# Patient Record
Sex: MALE | NOT HISPANIC OR LATINO | Employment: FULL TIME | URBAN - METROPOLITAN AREA
[De-identification: names, ages, dates, MRNs, and addresses within clinical notes are randomized per-mention and may not be internally consistent; named-entity substitution may affect disease eponyms.]

---

## 2023-11-28 ENCOUNTER — OFFICE VISIT (OUTPATIENT)
Dept: UROLOGY | Facility: CLINIC | Age: 33
End: 2023-11-28
Payer: COMMERCIAL

## 2023-11-28 VITALS — HEIGHT: 71 IN | TEMPERATURE: 96.9 F | BODY MASS INDEX: 24.92 KG/M2 | WEIGHT: 178 LBS

## 2023-11-28 DIAGNOSIS — R30.9 PAINFUL URINATION: ICD-10-CM

## 2023-11-28 LAB
APPEARANCE UR: CLEAR
BILIRUB UR STRIP.AUTO-MCNC: NEGATIVE MG/DL
COLOR UR: YELLOW
GLUCOSE UR STRIP.AUTO-MCNC: NEGATIVE MG/DL
KETONES UR STRIP.AUTO-MCNC: NEGATIVE MG/DL
LEUKOCYTE ESTERASE UR QL STRIP.AUTO: NEGATIVE
NITRITE UR QL STRIP.AUTO: NEGATIVE
PH UR STRIP.AUTO: 6 [PH]
PROT UR STRIP.AUTO-MCNC: NEGATIVE MG/DL
RBC # UR STRIP.AUTO: NEGATIVE /UL
SP GR UR STRIP.AUTO: 1.02
UROBILINOGEN UR STRIP.AUTO-MCNC: <2 MG/DL

## 2023-11-28 PROCEDURE — 87086 URINE CULTURE/COLONY COUNT: CPT

## 2023-11-28 PROCEDURE — 1036F TOBACCO NON-USER: CPT | Performed by: NURSE PRACTITIONER

## 2023-11-28 PROCEDURE — 99203 OFFICE O/P NEW LOW 30 MIN: CPT | Performed by: NURSE PRACTITIONER

## 2023-11-28 PROCEDURE — 81003 URINALYSIS AUTO W/O SCOPE: CPT

## 2023-11-28 PROCEDURE — 51798 US URINE CAPACITY MEASURE: CPT | Performed by: NURSE PRACTITIONER

## 2023-11-28 ASSESSMENT — PAIN SCALES - GENERAL: PAINLEVEL: 0-NO PAIN

## 2023-11-28 NOTE — PROGRESS NOTES
[facility]  Outpatient Clinic Note      Patient: Lina Fair  Age/Sex: 33 y.o., male  MRN: [id number]      History of Present Illness  33-year-old gentleman, otherwise healthy, presents as new patient for evaluation of symptoms. He reports a long standing history of episodes of painful urination that seem to self resolve. He has noticed that these episodes seem to worsen in colder months, or if he sits on anything cold. He develops pain after voiding throughout the urethra. This is not associated with gross hematuria, flank pain, fevers or chills. Additionally, he has noticed episodes of urinary urgency with postvoid dribble that is exacerbated with energy drinks. No fevers or chills.     Past Medical & Surgical History  No past medical history on file.   Past Surgical History:   Procedure Laterality Date    US GUIDED NEEDLE LIVER BIOPSY  6/9/2022    US GUIDED NEEDLE LIVER BIOPSY 6/9/2022 [facility]          Labs  NA    Medications:  No current outpatient medications on file prior to visit.     No current facility-administered medications on file prior to visit.          Physical Exam                                                                                                                      General: Well developed, well nourished, alert and cooperative, appears in no acute distress  Eyes: Non-injected conjunctiva, sclera clear, no proptosis  Cardiac: Extremities are warm and well perfused. No edema, cyanosis or pallor.   Lungs: Breathing is easy, non-labored. Speaking in clear and complete sentences. Normal diaphragmatic movement.  MSK: Ambulatory with steady gait, unassisted  Neuro: alert and oriented to person, place and time  Psych: Demonstrates good judgement and reason, without hallucinations, abnormal affect or abnormal behaviors.  Skin: no obvious lesions, no rashes      Review of Systems  Review of Systems   All other systems reviewed and are negative.         Imaging  NA      Assessment  & Plan  33-year-old gentleman, otherwise healthy, presents as new patient for evaluation of symptoms. He reports a long standing history of episodes of painful urination that seem to self resolve. He has noticed that these episodes seem to worsen in colder months, or if he sits on anything cold. He develops pain after voiding throughout the urethra. This is not associated with gross hematuria, flank pain, fevers or chills. Additionally, he has noticed episodes of urinary urgency with postvoid dribble that is exacerbated with energy drinks. No fevers or chills. PVR is 8cc.    We discussed his symptoms in great detail. Urine will be sent for UA and culture. He would greatly benefit from PFPT, explained. Referral was placed. We discussed he may have some degree of chronic inflammatory prostatitis, of which he would benefit from NSAIDs PRN. We discussed PFPT will help with these symptoms, as well. We discussed avoiding bladder irritants.     Follow-up 3 months. Will consider cystoscopy with any persistent symptoms.       Reviewed and approved by PIPPA PEDROZA on 11/28/23 at 1:15 PM.

## 2023-11-30 LAB — BACTERIA UR CULT: NO GROWTH

## 2023-12-28 ENCOUNTER — APPOINTMENT (OUTPATIENT)
Dept: RADIOLOGY | Facility: HOSPITAL | Age: 33
End: 2023-12-28
Payer: COMMERCIAL

## 2023-12-28 ENCOUNTER — HOSPITAL ENCOUNTER (EMERGENCY)
Facility: HOSPITAL | Age: 33
Discharge: HOME | End: 2023-12-28
Attending: STUDENT IN AN ORGANIZED HEALTH CARE EDUCATION/TRAINING PROGRAM
Payer: COMMERCIAL

## 2023-12-28 VITALS
HEIGHT: 71 IN | OXYGEN SATURATION: 99 % | SYSTOLIC BLOOD PRESSURE: 140 MMHG | TEMPERATURE: 98.4 F | RESPIRATION RATE: 16 BRPM | WEIGHT: 181.66 LBS | HEART RATE: 73 BPM | BODY MASS INDEX: 25.43 KG/M2 | DIASTOLIC BLOOD PRESSURE: 77 MMHG

## 2023-12-28 DIAGNOSIS — R10.32 LEFT INGUINAL PAIN: Primary | ICD-10-CM

## 2023-12-28 DIAGNOSIS — M79.18 MUSCULOSKELETAL PAIN: ICD-10-CM

## 2023-12-28 LAB
ALBUMIN SERPL-MCNC: 4.5 G/DL (ref 3.5–5)
ALP BLD-CCNC: 38 U/L (ref 35–125)
ALT SERPL-CCNC: 11 U/L (ref 5–40)
ANION GAP SERPL CALC-SCNC: 11 MMOL/L
APPEARANCE UR: CLEAR
AST SERPL-CCNC: 19 U/L (ref 5–40)
BASOPHILS # BLD AUTO: 0.05 X10*3/UL (ref 0–0.1)
BASOPHILS NFR BLD AUTO: 0.5 %
BILIRUB DIRECT SERPL-MCNC: <0.2 MG/DL (ref 0–0.2)
BILIRUB SERPL-MCNC: 0.4 MG/DL (ref 0.1–1.2)
BILIRUB UR STRIP.AUTO-MCNC: NEGATIVE MG/DL
BUN SERPL-MCNC: 12 MG/DL (ref 8–25)
CALCIUM SERPL-MCNC: 9.4 MG/DL (ref 8.5–10.4)
CHLORIDE SERPL-SCNC: 100 MMOL/L (ref 97–107)
CO2 SERPL-SCNC: 26 MMOL/L (ref 24–31)
COLOR UR: ABNORMAL
CREAT SERPL-MCNC: 1 MG/DL (ref 0.4–1.6)
EOSINOPHIL # BLD AUTO: 0.14 X10*3/UL (ref 0–0.7)
EOSINOPHIL NFR BLD AUTO: 1.4 %
ERYTHROCYTE [DISTWIDTH] IN BLOOD BY AUTOMATED COUNT: 12.6 % (ref 11.5–14.5)
GFR SERPL CREATININE-BSD FRML MDRD: >90 ML/MIN/1.73M*2
GLUCOSE SERPL-MCNC: 95 MG/DL (ref 65–99)
GLUCOSE UR STRIP.AUTO-MCNC: NORMAL MG/DL
HCT VFR BLD AUTO: 43.9 % (ref 41–52)
HGB BLD-MCNC: 14.9 G/DL (ref 13.5–17.5)
IMM GRANULOCYTES # BLD AUTO: 0.02 X10*3/UL (ref 0–0.7)
IMM GRANULOCYTES NFR BLD AUTO: 0.2 % (ref 0–0.9)
KETONES UR STRIP.AUTO-MCNC: ABNORMAL MG/DL
LEUKOCYTE ESTERASE UR QL STRIP.AUTO: NEGATIVE
LIPASE SERPL-CCNC: 24 U/L (ref 16–63)
LYMPHOCYTES # BLD AUTO: 1.97 X10*3/UL (ref 1.2–4.8)
LYMPHOCYTES NFR BLD AUTO: 20.1 %
MCH RBC QN AUTO: 30.2 PG (ref 26–34)
MCHC RBC AUTO-ENTMCNC: 33.9 G/DL (ref 32–36)
MCV RBC AUTO: 89 FL (ref 80–100)
MONOCYTES # BLD AUTO: 0.51 X10*3/UL (ref 0.1–1)
MONOCYTES NFR BLD AUTO: 5.2 %
NEUTROPHILS # BLD AUTO: 7.09 X10*3/UL (ref 1.2–7.7)
NEUTROPHILS NFR BLD AUTO: 72.6 %
NITRITE UR QL STRIP.AUTO: NEGATIVE
NRBC BLD-RTO: 0 /100 WBCS (ref 0–0)
PH UR STRIP.AUTO: 6.5 [PH]
PLATELET # BLD AUTO: 252 X10*3/UL (ref 150–450)
POTASSIUM SERPL-SCNC: 3.8 MMOL/L (ref 3.4–5.1)
PROT SERPL-MCNC: 7.4 G/DL (ref 5.9–7.9)
PROT UR STRIP.AUTO-MCNC: NEGATIVE MG/DL
RBC # BLD AUTO: 4.93 X10*6/UL (ref 4.5–5.9)
RBC # UR STRIP.AUTO: NEGATIVE /UL
SODIUM SERPL-SCNC: 137 MMOL/L (ref 133–145)
SP GR UR STRIP.AUTO: 1.02
UROBILINOGEN UR STRIP.AUTO-MCNC: NORMAL MG/DL
WBC # BLD AUTO: 9.8 X10*3/UL (ref 4.4–11.3)

## 2023-12-28 PROCEDURE — 81003 URINALYSIS AUTO W/O SCOPE: CPT | Performed by: EMERGENCY MEDICINE

## 2023-12-28 PROCEDURE — 83690 ASSAY OF LIPASE: CPT | Performed by: EMERGENCY MEDICINE

## 2023-12-28 PROCEDURE — 85025 COMPLETE CBC W/AUTO DIFF WBC: CPT | Performed by: EMERGENCY MEDICINE

## 2023-12-28 PROCEDURE — 82248 BILIRUBIN DIRECT: CPT | Performed by: EMERGENCY MEDICINE

## 2023-12-28 PROCEDURE — 93975 VASCULAR STUDY: CPT

## 2023-12-28 PROCEDURE — 36415 COLL VENOUS BLD VENIPUNCTURE: CPT | Performed by: EMERGENCY MEDICINE

## 2023-12-28 PROCEDURE — 2500000004 HC RX 250 GENERAL PHARMACY W/ HCPCS (ALT 636 FOR OP/ED): Performed by: EMERGENCY MEDICINE

## 2023-12-28 PROCEDURE — 96374 THER/PROPH/DIAG INJ IV PUSH: CPT

## 2023-12-28 PROCEDURE — 99285 EMERGENCY DEPT VISIT HI MDM: CPT | Performed by: STUDENT IN AN ORGANIZED HEALTH CARE EDUCATION/TRAINING PROGRAM

## 2023-12-28 PROCEDURE — 82374 ASSAY BLOOD CARBON DIOXIDE: CPT | Performed by: EMERGENCY MEDICINE

## 2023-12-28 PROCEDURE — 74176 CT ABD & PELVIS W/O CONTRAST: CPT

## 2023-12-28 RX ORDER — KETOROLAC TROMETHAMINE 30 MG/ML
15 INJECTION, SOLUTION INTRAMUSCULAR; INTRAVENOUS ONCE
Status: COMPLETED | OUTPATIENT
Start: 2023-12-28 | End: 2023-12-28

## 2023-12-28 RX ADMIN — KETOROLAC TROMETHAMINE 15 MG: 30 INJECTION, SOLUTION INTRAMUSCULAR at 19:26

## 2023-12-28 ASSESSMENT — PAIN DESCRIPTION - ORIENTATION: ORIENTATION: LEFT

## 2023-12-28 ASSESSMENT — PAIN SCALES - GENERAL
PAINLEVEL_OUTOF10: 6
PAINLEVEL_OUTOF10: 5 - MODERATE PAIN

## 2023-12-28 ASSESSMENT — PAIN - FUNCTIONAL ASSESSMENT
PAIN_FUNCTIONAL_ASSESSMENT: 0-10
PAIN_FUNCTIONAL_ASSESSMENT: 0-10

## 2023-12-28 ASSESSMENT — PAIN DESCRIPTION - FREQUENCY: FREQUENCY: CONSTANT/CONTINUOUS

## 2023-12-28 ASSESSMENT — PAIN DESCRIPTION - ONSET: ONSET: SUDDEN

## 2023-12-28 ASSESSMENT — PAIN DESCRIPTION - PAIN TYPE: TYPE: ACUTE PAIN

## 2023-12-28 ASSESSMENT — PAIN DESCRIPTION - DESCRIPTORS: DESCRIPTORS: DULL;PRESSURE

## 2023-12-28 ASSESSMENT — COLUMBIA-SUICIDE SEVERITY RATING SCALE - C-SSRS
2. HAVE YOU ACTUALLY HAD ANY THOUGHTS OF KILLING YOURSELF?: NO
1. IN THE PAST MONTH, HAVE YOU WISHED YOU WERE DEAD OR WISHED YOU COULD GO TO SLEEP AND NOT WAKE UP?: NO
6. HAVE YOU EVER DONE ANYTHING, STARTED TO DO ANYTHING, OR PREPARED TO DO ANYTHING TO END YOUR LIFE?: NO

## 2023-12-28 ASSESSMENT — PAIN DESCRIPTION - PROGRESSION: CLINICAL_PROGRESSION: GRADUALLY WORSENING

## 2023-12-28 ASSESSMENT — PAIN DESCRIPTION - LOCATION: LOCATION: GROIN

## 2023-12-28 NOTE — Clinical Note
Lina Christopher was seen and treated in our emergency department on 12/28/2023.  He may return to work on 12/29/2023.  Please allow for lighter options for tool belt.     If you have any questions or concerns, please don't hesitate to call.      Daniel Esparza MD

## 2023-12-29 NOTE — ED PROVIDER NOTES
HPI   Chief Complaint   Patient presents with    Groin Pain     Pt having left groin pain as well as low back pain since early last week. Pt states he does have a physical job and feels he may have pulled something or has a hernia.       HPI  See MDM                  No data recorded                Patient History   No past medical history on file.  Past Surgical History:   Procedure Laterality Date    US GUIDED NEEDLE LIVER BIOPSY  6/9/2022    US GUIDED NEEDLE LIVER BIOPSY 6/9/2022 [facility]     No family history on file.  Social History     Tobacco Use    Smoking status: Former     Types: Cigarettes    Smokeless tobacco: Never   Substance Use Topics    Alcohol use: Yes     Comment: socailly    Drug use: Never       Physical Exam   ED Triage Vitals [12/28/23 1911]   Temp Heart Rate Resp BP   36.9 °C (98.4 °F) 73 16 140/78      SpO2 Temp Source Heart Rate Source Patient Position   99 % Temporal Monitor Sitting      BP Location FiO2 (%)     Right arm --       Physical Exam  See [facility]  ED Course & MDM   Diagnoses as of 12/28/23 2225   Left inguinal pain   Musculoskeletal pain       Medical Decision Making  33-year-old male with no significant past medical history presents to the emergency room with groin pain.  Patient notes that the pain has been going on for at least 10 to 15 days and today he felt as though it was not getting better and presents for evaluation.  Patient is unable to exactly describe what affects him but he does note when he is sitting down for longer periods of time and on his off days the pain is more uncomfortable as when he is at work he is typically very active and moves around a great deal and does not notice it as much.  Patient otherwise denies any pain on urination, flank pain, nausea, vomiting, diarrhea, constipation, surgical history, shortness of breath, chest pain, abnormal discharge.  Patient otherwise is sexually active only with his wife and otherwise has no history of sexually  transmitted infection.    ED Triage Vitals [12/28/23 1911]   Temp Heart Rate Resp BP   36.9 °C (98.4 °F) 73 16 140/78      SpO2 Temp Source Heart Rate Source Patient Position   99 % Temporal Monitor Sitting      BP Location FiO2 (%)     Right arm --       Vital signs reviewed: Afebrile, nontachycardic, normotensive, 99% on room air    Exam     Constitutional: No acute distress. Resting comfortably.   Head: Normocephalic, atraumatic.   Eyes: Pupils equal bilaterally, EOM grossly intact, conjunctiva normal.  Mouth/Throat: Oropharynx is clear, moist mucus membranes.   Neck: Supple. No lymphadenopathy.  Cardiovascular: Regular rate and regular rhythm. Extremities are well-perfused.   Pulmonary/Chest: No respiratory distress, breathing comfortably on room air.    Abdominal: Soft, non-tender, non-distended. No rebound or guarding.   Musculoskeletal: No lower extremity edema.       Skin: Warm, dry, and intact.   Neurological: Patient is oriented to person, place, time, and situation. Face symmetric, hearing intact to voice, speech normal. Moves all extremities.   Psych: Mood, affect, thought content, and judgment normal.    Differential includes but is not limited to:  Testicular torsion versus diverticulitis versus hernia versus nephrolithiasis    Amount and/or Complexity of Data Reviewed  External Data Reviewed: notes.      Labs: ordered.  Labs Reviewed   URINALYSIS WITH REFLEX MICROSCOPIC - Abnormal       Result Value    Color, Urine Light-Yellow      Appearance, Urine Clear      Specific Gravity, Urine 1.025      pH, Urine 6.5      Protein, Urine NEGATIVE      Glucose, Urine Normal      Blood, Urine NEGATIVE      Ketones, Urine 10 (1+) (*)     Bilirubin, Urine NEGATIVE      Urobilinogen, Urine Normal      Nitrite, Urine NEGATIVE      Leukocyte Esterase, Urine NEGATIVE     BASIC METABOLIC PANEL - Normal    Glucose 95      Sodium 137      Potassium 3.8      Chloride 100      Bicarbonate 26      Urea Nitrogen 12       Creatinine 1.00      eGFR >90      Calcium 9.4      Anion Gap 11     HEPATIC FUNCTION PANEL - Normal    AST 19      ALT 11      Alkaline Phosphatase 38      Bilirubin, Total 0.4      Bilirubin, Direct <0.2      Total Protein 7.4      Albumin 4.5     LIPASE - Normal    Lipase 24     CBC WITH AUTO DIFFERENTIAL    WBC 9.8      nRBC 0.0      RBC 4.93      Hemoglobin 14.9      Hematocrit 43.9      MCV 89      MCH 30.2      MCHC 33.9      RDW 12.6      Platelets 252      Neutrophils % 72.6      Immature Granulocytes %, Automated 0.2      Lymphocytes % 20.1      Monocytes % 5.2      Eosinophils % 1.4      Basophils % 0.5      Neutrophils Absolute 7.09      Immature Granulocytes Absolute, Automated 0.02      Lymphocytes Absolute 1.97      Monocytes Absolute 0.51      Eosinophils Absolute 0.14      Basophils Absolute 0.05         Radiology: ordered and independent interpretation performed.  CT abdomen pelvis is interpreted by me shows no large dilated loops of bowel suggest bowel obstruction.    ECG/medicine tests: ordered and independent interpretation performed.  Diagnoses as of 12/28/23 2225   Left inguinal pain   Musculoskeletal pain       Lab work as interpreted by me shows no significant leukocytosis or anemia at this time on CBC and basic metabolic panel with no acute findings with normal hepatic function panel.  Urinalysis shows small ketones but otherwise no evidence of urinary tract infection or blood.  CT abdomen pelvis per radiology with no acute findings and ultrasound testicular with no evidence of testicular torsion.  Patient's presentation and chronic pain does not seem consistent with testicular torsion or testicular issues at this time and patient otherwise with no high risk factors for sexually transmitted infection.  Patient does have follow-up with urology as well as primary care provider and will follow-up as needed.    PLAN AND FOLLOW-UP: Patient counseled on all findings, diagnosis and treatment plan.  Patient's questions and concerns addressed. Patient stable, discharged with instructions to follow up with PMD, and to return to ED at any time for worsening symptoms or any other concerns. Patient demonstrates understanding of the findings and the importance of appropriate follow up care.  PATIENT REFERRED TO:  Jesus Garces MD  [address] [place] [address]  [facility] [address]  [place] [address]  [phone]                DISCHARGE MEDICATIONS:  New Prescriptions    No medications on file       Daniel Esparza MD  10:25 PM    Attending Emergency Physician  [facility]            Procedure  Procedures     Daniel Esparza MD  12/28/23 1194

## 2023-12-29 NOTE — DISCHARGE INSTRUCTIONS
Thank you for choosing [facility] and [facility]  for your emergency care.    Please return to the Emergency Department immediately if new or worsening symptoms occur. Symptoms that are most concerning include testicular pain or fevers or chills that necessitate immediate return.     It is important to remember that your care does not end here and you must continue to monitor your condition closely. Please return to the emergency department for any worsening or concerning signs or symptoms as directed by our conversations and the discharge instructions. Otherwise please follow up with your doctor in 2 days if no better or worse. If you do not have a doctor please contact the referral number on your discharge instructions. Please contact any physician specialists provided in your discharge notes as it is very important to follow up with them regarding your condition. If you are unable to reach the physicians provided, please come back to the Emergency Department at any time.      As always, please take medications as directed. If you have any questions at all regarding your medications, please contact the pharmacist, the emergency department, or your doctor. Before taking any medication prescribed in the Emergency Department, please review the medication side effects and drug interactions as they may interact with your home medications.     Having trouble affording medications? Try [employer] ! (This is not a hospital endorsed website, merely a recommendation based on my own personal experiences with [employer])       Daniel Esparza MD

## 2023-12-29 NOTE — ED TRIAGE NOTES
TRIAGE NOTE   I saw the patient as the Clinician in Triage and performed a brief history and physical exam, established acuity, and ordered appropriate tests to develop basic plan of care. Patient will be seen by an GRANT, resident and/or physician who will independently evaluate the patient. Please see subsequent provider notes for further details and disposition.     Brief HPI: In brief, Lina Christopher is a 33 y.o. male that presents for back and groin pain.  States for the last 1 week he has had pain through his low back, pain down into his groin.  No johnathon urinary symptoms.  No fevers.  Uncertain if this is an injury.  No illnesses otherwise or other complaints.    Focused Physical exam:   General: Awake, alert, oriented and appears in no acute distress sitting upright  HEENT: NCAT, EOMI, sclerae anicteric, oropharynx clear, and neck is supple  CV: RRR, no extremity pulse deficits  Resp: Lungs CTA b/l, no respiratory distress or accessory muscle use  Abd: Soft, no tenderness, rebound, or guarding  Back: Nontender  Ext: WWP, cap refill < 2 sec  Skin: Dry without rash  Neuro: No focal deficits, gait steady    Plan/MDM:   33-year-old male presents reporting back and groin pain for the last several days.  Exam well, afebrile.  Abdomen without focal tenderness.  No rebound or guarding or suggestion of peritonitis.  He is not clinically obstructed.  Plan for lab work, imaging, reassessment.    Please see subsequent provider note for further details and disposition

## 2024-01-03 ENCOUNTER — APPOINTMENT (OUTPATIENT)
Dept: PHYSICAL THERAPY | Facility: CLINIC | Age: 34
End: 2024-01-03
Payer: COMMERCIAL

## 2024-01-22 ENCOUNTER — TREATMENT (OUTPATIENT)
Dept: PHYSICAL THERAPY | Facility: CLINIC | Age: 34
End: 2024-01-22
Payer: COMMERCIAL

## 2024-01-22 DIAGNOSIS — R30.9 PAINFUL URINATION: ICD-10-CM

## 2024-01-22 PROCEDURE — 97112 NEUROMUSCULAR REEDUCATION: CPT | Mod: GP | Performed by: PHYSICAL THERAPIST

## 2024-01-22 PROCEDURE — 97162 PT EVAL MOD COMPLEX 30 MIN: CPT | Mod: GP | Performed by: PHYSICAL THERAPIST

## 2024-01-22 PROCEDURE — 97110 THERAPEUTIC EXERCISES: CPT | Mod: GP | Performed by: PHYSICAL THERAPIST

## 2024-01-22 ASSESSMENT — PAIN SCALES - GENERAL: PAINLEVEL_OUTOF10: 0 - NO PAIN

## 2024-01-22 ASSESSMENT — PAIN - FUNCTIONAL ASSESSMENT: PAIN_FUNCTIONAL_ASSESSMENT: 0-10

## 2024-01-22 NOTE — PROGRESS NOTES
Physical Therapy Evaluation    Patient Name: Lina Christopher  MRN: [id number]  Today's Date: 1/22/24  Time Calculation  Start Time: 1030  Stop Time: 1135  Time Calculation (min): 65 min      Subjective   General:  General  Reason for Referral: painful urination  Referred By: Aurea Cash CNP    Patient reported hx of injury: started a year ago;  feels like some urine leaks out after;  in the past recalls that there was pain after urination-- could feel pressure into buttock-- this hasn't happened (no leaking);  seeing chiropractor for back pain     Precautions:  Precautions  Precautions Comment: no precautions  Pain:  Pain Assessment: 0-10  Pain Score: 0 - No pain  Home Living:     Job task:  a lot of walking with job   Prior Function Per Pt/Caregiver Report:     Independent    Objective   Posture:   Slight increase in lumbar lordosis  Range of Motion:     Lumbar ROM Range   Flexion 50%   Extension 50%   R sidebend 50%   L sidebend 50%    WFL ROM HIP    Strength:     LE MMT L R   Hip flexion 5/5 5/5   Hip extension 4/5 4/5   Hip abduction 4/5 4/5   Knee flexion 5/5 5/5   Knee extension 5/5 5/5   Ankle DF 5/5 5/5      Fair transverse abdominis activation  Fair load transfer with active straight leg raise    Flexibility:   Tightness in piriformis bilateral, tight bilateral hamstrings, tight psoas bilateral, tightness in adductors bilateral    Palpation:   Not tender to palpation of gluteal      Objective   PELVIC HISTORY:    Water intake/diet: 50 ounces of liquid -- (about 1/2 or less of water)    PELVIC PAIN:     Description: used to have pain post urination but of lately has had no pain but is having leaking post urination;  usually stands to urinate  Frequency: after every time patient voids  Pain with intercourse: no pain;    History of back pain: yes a few months ago injured with lifting;  -- continues to see chiropractor ([facility])     EXERCISE:  Do you do Kegels? no   Current exercise regime: only works out  1-2 times a week;  -- Works in a [employer]-- lots of walking (20,000+ steps a day)    BLADDER:     Daytime Voiding Frequency: goes to bathroom 10 times a day;    Nighttime Voiding Frequency: no usual waking   Unintentional urine loss frequency: after every void  Leakage occurs with: post void   Leakage amount: dribbles  Slow/weak urine stream: when strains, slow or weak stream  Difficulty starting urine stream/push to urinate: sometimes has to strain;    Able to completely empty bladder: feels at time of urination -- empties bladder    BOWEL:     Denies constipation or bowel issues    INTERNAL PALPATION:  -- Patient declined internal/external pelvic floor examination    OUTCOMES MEASURE:  PFIQ-7    Assessment  PT Assessment Results: Decreased strength, Decreased range of motion, Decreased coordination, Impaired tone  Rehab Prognosis: Good  Evaluation/Treatment Tolerance: Patient tolerated treatment well    Pt is a 33 y.o. male who presents with impairments of tightness, weakness, impaired ROM. These impairments have led to functional limitations including incontinence post voiding and pain post urination. Pt would benefit from skilled physical therapy intervention to improve above impairments and facilitate return to function.    Plan  Treatment/Interventions: Dry needling, Education/ Instruction, Electrical stimulation, Biofeedback, Manual therapy, Neuromuscular re-education, Therapeutic exercises, Therapeutic activities  PT Plan: Skilled PT  PT Frequency: 1 time per week  Duration: 10 sessions  Number of Treatments Authorized: 30 visits, 50$ copay  Rehab Potential: Good    OP EDUCATION:  Outpatient Education  Individual(s) Educated: Patient  Education Provided: Anatomy, Home Exercise Program, POC, Physiology  Risk and Benefits Discussed with Patient/Caregiver/Other: yes  Patient/Caregiver Demonstrated Understanding: yes  Plan of Care Discussed and Agreed Upon: yes  Patient Response to Education: Patient/Caregiver  Verbalized Understanding of Information, Patient/Caregiver Performed Return Demonstration of Exercises/Activities, Patient/Caregiver Asked Appropriate Questions    Today's Treatment:  HEP to be completed daily, exercises include:  Therapeutic exercises:    Stretching exercises:  butterfly stretch 10 sec x 3, HS stretch with opposite leg down 10 sec x 3, happy baby 10 sec x 3, piriformis stretch with use of towel 10 sec x 3   Neuro-Re-education:    Diaphragmatic breathing -- explained and educated on breathing mechanics   Explained anatomy of pelvic floor     Goals:  Active       Pelvic Floor       Patient will continue to report no pain post urination       Start:  01/22/24    Expected End:  03/12/24            Reduce frequency or urine loss to: 75%  post urination        Start:  01/22/24    Expected End:  05/01/24            Patient will have good transverse abdominis activation with dynamic movement to stabilize pelvic floor and spine during ADLs       Start:  01/22/24    Expected End:  05/01/24            patient will be knowledgeable with regards to diaphragmatic breathing to downtrain and reduce tightness       Start:  01/22/24    Expected End:  03/12/24            Patient will increase LE strength to at least 4+/5 to improve ability to perform ADLs without incontinence       Start:  01/22/24    Expected End:  05/01/24

## 2024-02-06 ENCOUNTER — TREATMENT (OUTPATIENT)
Dept: PHYSICAL THERAPY | Facility: CLINIC | Age: 34
End: 2024-02-06
Payer: COMMERCIAL

## 2024-02-06 DIAGNOSIS — R30.9 PAINFUL URINATION: ICD-10-CM

## 2024-02-06 PROCEDURE — 97112 NEUROMUSCULAR REEDUCATION: CPT | Mod: GP | Performed by: PHYSICAL THERAPIST

## 2024-02-06 PROCEDURE — 97110 THERAPEUTIC EXERCISES: CPT | Mod: GP | Performed by: PHYSICAL THERAPIST

## 2024-02-06 ASSESSMENT — PAIN - FUNCTIONAL ASSESSMENT: PAIN_FUNCTIONAL_ASSESSMENT: 0-10

## 2024-02-06 ASSESSMENT — PAIN SCALES - GENERAL: PAINLEVEL_OUTOF10: 0 - NO PAIN

## 2024-02-06 NOTE — PROGRESS NOTES
Physical Therapy Treatment    Patient Name: Lina Christopher  MRN: [id number]  Encounter date:  2/6/2024  Time Calculation  Start Time: 0833  Stop Time: 0926  Time Calculation (min): 53 min     PT Therapeutic Procedures Time Entry  Neuromuscular Re-Education Time Entry: 10  Therapeutic Exercise Time Entry: 43    Visit Number:  2 (including evaluation)  Planned total visits: 10 per POC  Visit Authorized/Insurance Considerations:  $50 COPAY PER VISIT/ 100% COVERAGE/ NO AUTH/ 30 VISITS THEN REVIEW IF NEEDED/ PER [facility] SITE REF#    Current Problem  1. Painful urination  Follow Up In Physical Therapy          Precautions  Precautions  Precautions Comment: no precautions    Pain  Pain Assessment: 0-10  Pain Score: 0 - No pain    Subjective  General  Reason for Referral: painful urination  Referred By: Aurea Cash, CNP     Has felt better since evaluation; only felt 2 times small dribbles post voiding;      Objective  Tighter left piriformis than right    Treatment:  Therapeutic exercises:    Stretching exercises:    butterfly stretch 10 sec x 3,   HS stretch with strap with opposite leg down 10 sec x 3,   DKTC with ball x 10 sec x 5   happy baby modified with red ball 10 sec x 3,   piriformis stretch with ball 10 sec x 3   LTR with ball x 10   Narrow LTR small range x 10   Wide LTR x 10 (hip IR/ER)  Hip flexor stretch off side of bed  Hip flexor standing stretch with step 10 sec x 1  Hamstring stretch with step 10 sec x 1  Reviewed diaphragmatic breathing and exhale on effort  Clamshells x 10  Bridging x 10    OP EDUCATION:  Outpatient Education  Individual(s) Educated: Patient  Education Provided: Anatomy, Home Exercise Program, POC, Physiology  Risk and Benefits Discussed with Patient/Caregiver/Other: yes  Patient/Caregiver Demonstrated Understanding: yes  Plan of Care Discussed and Agreed Upon: yes  Patient Response to Education: Patient/Caregiver Verbalized Understanding of Information, Patient/Caregiver Performed  Return Demonstration of Exercises/Activities, Patient/Caregiver Asked Appropriate Questions  Updated HEP    Assessment:  PT Assessment  PT Assessment Results: Decreased strength, Decreased range of motion, Decreased coordination, Impaired tone  Rehab Prognosis: Good  Evaluation/Treatment Tolerance: Patient tolerated treatment well  Pt's response to treatment:  Less post void dribble since evaluation.  Patient learning to properly breath to relax and contract pelvic floor.      Pain end of session:  0    Plan:  OP PT Plan  Treatment/Interventions: Dry needling, Education/ Instruction, Electrical stimulation, Biofeedback, Manual therapy, Neuromuscular re-education, Therapeutic exercises, Therapeutic activities  PT Plan: Skilled PT  PT Frequency: 1 time per week  Duration: 10 sessions  Number of Treatments Authorized: 30 visits, 50$ copay  Rehab Potential: Good  Continue with current POC with the following changes progress stretching as able and pair with breathing    Assessment of current progress against goals:  Progressing toward functional goals    Goals:  Active       Pelvic Floor       Patient will continue to report no pain post urination       Start:  01/22/24    Expected End:  03/12/24            Reduce frequency or urine loss to: 75%  post urination        Start:  01/22/24    Expected End:  05/01/24            Patient will have good transverse abdominis activation with dynamic movement to stabilize pelvic floor and spine during ADLs       Start:  01/22/24    Expected End:  05/01/24            patient will be knowledgeable with regards to diaphragmatic breathing to downtrain and reduce tightness       Start:  01/22/24    Expected End:  03/12/24            Patient will increase LE strength to at least 4+/5 to improve ability to perform ADLs without incontinence       Start:  01/22/24    Expected End:  05/01/24

## 2024-02-14 ENCOUNTER — APPOINTMENT (OUTPATIENT)
Dept: PHYSICAL THERAPY | Facility: CLINIC | Age: 34
End: 2024-02-14
Payer: COMMERCIAL

## 2024-02-20 ENCOUNTER — TREATMENT (OUTPATIENT)
Dept: PHYSICAL THERAPY | Facility: CLINIC | Age: 34
End: 2024-02-20
Payer: COMMERCIAL

## 2024-02-20 DIAGNOSIS — R30.9 PAINFUL URINATION: ICD-10-CM

## 2024-02-20 PROCEDURE — 97110 THERAPEUTIC EXERCISES: CPT | Mod: GP | Performed by: PHYSICAL THERAPIST

## 2024-02-20 ASSESSMENT — PAIN SCALES - GENERAL: PAINLEVEL_OUTOF10: 0 - NO PAIN

## 2024-02-20 ASSESSMENT — PAIN - FUNCTIONAL ASSESSMENT: PAIN_FUNCTIONAL_ASSESSMENT: 0-10

## 2024-02-20 NOTE — PROGRESS NOTES
Physical Therapy Treatment    Patient Name: Lina Christopher  MRN: [id number]  Encounter date:  2/20/2024  Time Calculation  Start Time: 0845  Stop Time: 0930  Time Calculation (min): 45 min     PT Therapeutic Procedures Time Entry  Therapeutic Exercise Time Entry: 45    Patient 11 minutes late for session this date.    Visit Number:  3 (including evaluation)  Planned total visits: 10 per POC  Visit Authorized/Insurance Considerations:  $50 COPAY PER VISIT/ 100% COVERAGE/ NO AUTH/ 30 VISITS THEN REVIEW IF NEEDED/ PER [facility] SITE REF#    Current Problem  1. Painful urination  Follow Up In Physical Therapy        Precautions  Precautions  Precautions Comment: no precautions    Pain  Pain Assessment: 0-10  Pain Score: 0 - No pain    Subjective  General  Reason for Referral: painful urination  Referred By: Aurea Cash CNP     Still has some leakage very intermittent. (Once every few days) when does leak is only a drop  Feels like no change in standing or sitting;  feels like constipation influences urination.  Hasn't had much pain with urination unless constipation;  heated car seats help;      Objective  Good performance of breathing with exercises    Treatment:  Therapeutic exercises:    Stretching exercises:    Deep squat holding onto railing 10 breathes x 3   Standing HS stretch with step 10 sec x 3   Adductor stretch in half kneeling 10 sec x 3   DKTC with ball x 10 sec x 5   happy baby modified with red ball 10 sec x 3,   piriformis stretch with ball 10 sec x 3   LTR with ball x 10   Wide LTR x 10 (hip IR/ER)  Quadruped hip extension x 10   Bridging x 10 with ball squeeze   Prone hip extension x 10     Current HEP:  Stretching and strengthening exercises of pelvic floor with breathing properly    OP EDUCATION:  Outpatient Education  Individual(s) Educated: Patient  Education Provided: Anatomy, Home Exercise Program, POC, Physiology  Risk and Benefits Discussed with Patient/Caregiver/Other: yes  Patient/Caregiver  Demonstrated Understanding: yes  Plan of Care Discussed and Agreed Upon: yes  Patient Response to Education: Patient/Caregiver Verbalized Understanding of Information, Patient/Caregiver Performed Return Demonstration of Exercises/Activities, Patient/Caregiver Asked Appropriate Questions    Assessment:  PT Assessment  PT Assessment Results: Decreased strength, Decreased range of motion, Decreased coordination, Impaired tone  Rehab Prognosis: Good  Evaluation/Treatment Tolerance: Patient tolerated treatment well  Pt's response to treatment:  Patient with improved with voiding and pain.  Patient able to attend every 2 weeks with improvement.  Patient continues to benefit from skilled PT to reduce tightness and improve gluteal strength and progress towards established physical therapy goals.     Pain end of session: 0    Plan:  OP PT Plan  Treatment/Interventions: Dry needling, Education/ Instruction, Electrical stimulation, Biofeedback, Manual therapy, Neuromuscular re-education, Therapeutic exercises, Therapeutic activities  PT Plan: Skilled PT  PT Frequency: 1 time per week  Duration: 10 sessions  Number of Treatments Authorized: 30 visits, 50$ copay  Rehab Potential: Good  Continue with current POC/no changes    Assessment of current progress against goals:  Progressing toward functional goals    Goals:  Active       Pelvic Floor       Patient will continue to report no pain post urination       Start:  01/22/24    Expected End:  03/12/24            Reduce frequency or urine loss to: 75%  post urination        Start:  01/22/24    Expected End:  05/01/24            Patient will have good transverse abdominis activation with dynamic movement to stabilize pelvic floor and spine during ADLs       Start:  01/22/24    Expected End:  05/01/24            patient will be knowledgeable with regards to diaphragmatic breathing to downtrain and reduce tightness       Start:  01/22/24    Expected End:  03/12/24            Patient  will increase LE strength to at least 4+/5 to improve ability to perform ADLs without incontinence       Start:  01/22/24    Expected End:  05/01/24

## 2024-02-27 ENCOUNTER — APPOINTMENT (OUTPATIENT)
Dept: PHYSICAL THERAPY | Facility: CLINIC | Age: 34
End: 2024-02-27
Payer: COMMERCIAL

## 2024-03-05 ENCOUNTER — APPOINTMENT (OUTPATIENT)
Dept: PHYSICAL THERAPY | Facility: CLINIC | Age: 34
End: 2024-03-05
Payer: COMMERCIAL

## 2024-03-05 ENCOUNTER — APPOINTMENT (OUTPATIENT)
Dept: UROLOGY | Facility: CLINIC | Age: 34
End: 2024-03-05
Payer: COMMERCIAL

## 2024-03-12 ENCOUNTER — APPOINTMENT (OUTPATIENT)
Dept: PHYSICAL THERAPY | Facility: CLINIC | Age: 34
End: 2024-03-12
Payer: COMMERCIAL

## 2024-03-19 ENCOUNTER — APPOINTMENT (OUTPATIENT)
Dept: PHYSICAL THERAPY | Facility: CLINIC | Age: 34
End: 2024-03-19
Payer: COMMERCIAL

## 2024-03-20 ENCOUNTER — TREATMENT (OUTPATIENT)
Dept: PHYSICAL THERAPY | Facility: CLINIC | Age: 34
End: 2024-03-20
Payer: COMMERCIAL

## 2024-03-20 DIAGNOSIS — R30.9 PAINFUL URINATION: ICD-10-CM

## 2024-03-20 PROCEDURE — 97110 THERAPEUTIC EXERCISES: CPT | Mod: GP | Performed by: PHYSICAL THERAPIST

## 2024-03-20 PROCEDURE — 97112 NEUROMUSCULAR REEDUCATION: CPT | Mod: GP | Performed by: PHYSICAL THERAPIST

## 2024-03-20 ASSESSMENT — PAIN SCALES - GENERAL: PAINLEVEL_OUTOF10: 0 - NO PAIN

## 2024-03-20 ASSESSMENT — PAIN - FUNCTIONAL ASSESSMENT: PAIN_FUNCTIONAL_ASSESSMENT: 0-10

## 2024-03-20 NOTE — PROGRESS NOTES
Physical Therapy Treatment    Patient Name: Lina Christopher  MRN: [id number]  Encounter date:  3/20/2024  Time Calculation  Start Time: 1137  Stop Time: 1230  Time Calculation (min): 53 min     PT Therapeutic Procedures Time Entry  Neuromuscular Re-Education Time Entry: 10  Therapeutic Exercise Time Entry: 43    Visit Number:  4 (including evaluation)  Planned total visits: 10 per POC  Visit Authorized/Insurance Considerations:  $50 COPAY PER VISIT/ 100% COVERAGE/ NO AUTH/ 30 VISITS THEN REVIEW IF NEEDED/ PER [facility] SITE REF#    Current Problem  1. Painful urination  Follow Up In Physical Therapy          Precautions  Precautions  Precautions Comment: no precautions    Pain  Pain Assessment: 0-10  Pain Score: 0 - No pain    Subjective  General  Reason for Referral: painful urination  Referred By: Aurea Cash CNP     Overall better since beginning therapy, but still notices small amount of post void dribble occasionally.  Hasn't been complaint with exercises past few weeks as had a death in family.      Objective  Cues to coordinate breathing  Patient reporting he can tell when he is doing a kegel    Treatment:  Therapeutic exercises:    Stretching exercises:    DKTC with ball x 10 sec x 5   happy baby modified with red ball 10 sec x 3,   piriformis stretch with ball 10 sec x 3   LTR with ball x 10   Wide LTR x 10 (hip IR/ER)  Quadruped bird dog  x 10   Bridging x 10   10# sport cord walk outs with push pull x 1 x 10 reps bilateral  Lat pull down 10# sport cord x 10   Wide row 10# sport cord x 10   Dead bug x 10 with brace  Kegel x 10 -- PT walked patient through how to perform kegel properly  Neuro re-education:    Discussed trying breathing and kegel a few times post void to maximize      Current HEP:  Stretching and strengthening exercises of pelvic floor with breathing properly     OP EDUCATION:  Outpatient Education  Individual(s) Educated: Patient  Education Provided: Anatomy, Home Exercise Program, POC,  Physiology  Risk and Benefits Discussed with Patient/Caregiver/Other: yes  Patient/Caregiver Demonstrated Understanding: yes  Plan of Care Discussed and Agreed Upon: yes  Patient Response to Education: Patient/Caregiver Verbalized Understanding of Information, Patient/Caregiver Performed Return Demonstration of Exercises/Activities, Patient/Caregiver Asked Appropriate Questions    Assessment:  PT Assessment  PT Assessment Results: Decreased strength, Decreased range of motion, Decreased coordination, Impaired tone  Rehab Prognosis: Good  Evaluation/Treatment Tolerance: Patient tolerated treatment well  Pt's response to treatment:  Overall improved since evaluation.  Not having any pain any longer.  Anticipate full recovery once able to commit to exercises.  Patient continues to benefit from skilled PT to continue stretching and strengthening and progress towards established physical therapy goals.       Pain end of session: 0    Plan:  OP PT Plan  Treatment/Interventions: Dry needling, Education/ Instruction, Electrical stimulation, Biofeedback, Manual therapy, Neuromuscular re-education, Therapeutic exercises, Therapeutic activities  PT Plan: Skilled PT  PT Frequency: 1 time per week  Duration: 10 sessions  Number of Treatments Authorized: 30 visits, 50$ copay  Rehab Potential: Good  Continue with current POC/no changes    Assessment of current progress against goals:  Progressing toward functional goals    Goals:  Active       Pelvic Floor       Patient will continue to report no pain post urination       Start:  01/22/24    Expected End:  03/12/24            Reduce frequency or urine loss to: 75%  post urination        Start:  01/22/24    Expected End:  05/01/24            Patient will have good transverse abdominis activation with dynamic movement to stabilize pelvic floor and spine during ADLs       Start:  01/22/24    Expected End:  05/01/24            patient will be knowledgeable with regards to  diaphragmatic breathing to downtrain and reduce tightness       Start:  01/22/24    Expected End:  03/12/24            Patient will increase LE strength to at least 4+/5 to improve ability to perform ADLs without incontinence       Start:  01/22/24    Expected End:  05/01/24

## 2024-03-26 ENCOUNTER — TREATMENT (OUTPATIENT)
Dept: PHYSICAL THERAPY | Facility: CLINIC | Age: 34
End: 2024-03-26
Payer: COMMERCIAL

## 2024-03-26 DIAGNOSIS — R30.9 PAINFUL URINATION: ICD-10-CM

## 2024-03-26 PROCEDURE — 97112 NEUROMUSCULAR REEDUCATION: CPT | Mod: GP | Performed by: PHYSICAL THERAPIST

## 2024-03-26 PROCEDURE — 97110 THERAPEUTIC EXERCISES: CPT | Mod: GP | Performed by: PHYSICAL THERAPIST

## 2024-03-26 ASSESSMENT — PAIN - FUNCTIONAL ASSESSMENT: PAIN_FUNCTIONAL_ASSESSMENT: 0-10

## 2024-03-26 ASSESSMENT — PAIN SCALES - GENERAL: PAINLEVEL_OUTOF10: 0 - NO PAIN

## 2024-03-26 NOTE — PROGRESS NOTES
Physical Therapy Treatment    Patient Name: Lina Christopher  MRN: [id number]  Encounter date:  3/26/2024  Time Calculation  Start Time: 0800  Stop Time: 0853  Time Calculation (min): 53 min     PT Therapeutic Procedures Time Entry  Neuromuscular Re-Education Time Entry: 10  Therapeutic Exercise Time Entry: 43    Visit Number:  5 (including evaluation)  Planned total visits: 10 per POC  Visit Authorized/Insurance Considerations:  $50 COPAY PER VISIT/ 100% COVERAGE/ NO AUTH/ 30 VISITS THEN REVIEW IF NEEDED/ PER [facility] SITE REF#    Current Problem  1. Painful urination  Follow Up In Physical Therapy            Precautions  Precautions  Precautions Comment: no precautions    Pain  Pain Assessment: 0-10  Pain Score: 0 - No pain    Subjective  General  Reason for Referral: painful urination  Referred By: Aurea Cash CNP     Feels like things are getting better since doing exercises more often now;      Objective  Able to feel kegel contraction when sitting on theraball    Treatment:  Therapeutic exercises:    piriformis stretch with ball 10 sec x 3   Wide LTR x 10 (hip IR/ER)  Bridging x 10 with ball squeeze  Hip IR with ball squeeze sitting on bed with orange band x 10   Dead bug supine alternate UE/LE x 10   Bird dog x 10   Lat pull down 10# symmetry cord x 10   Wide rows 10# symmetry cord x 10   Walk outs with push outs x 5 bilateral with 5 push outs  Stir the pot with walk out 1 x 10 stirs CW/x10 CCW  Brace with SLR x 10   Sitting on red theraball Marching x 10   Sitting on red theraball LAQ x 10   Neuro re-ed  Kegel sitting on thera ball to assist in coordination and biofeedback of technique x 15     Current HEP:  Stretching and strengthening exercises of pelvic floor with breathing properly       OP EDUCATION:  Outpatient Education  Individual(s) Educated: Patient  Education Provided: Anatomy, Home Exercise Program, POC, Physiology  Risk and Benefits Discussed with Patient/Caregiver/Other: yes  Patient/Caregiver  Demonstrated Understanding: yes  Plan of Care Discussed and Agreed Upon: yes  Patient Response to Education: Patient/Caregiver Verbalized Understanding of Information, Patient/Caregiver Performed Return Demonstration of Exercises/Activities, Patient/Caregiver Asked Appropriate Questions    Assessment:  PT Assessment  PT Assessment Results: Decreased strength, Decreased range of motion, Decreased coordination, Impaired tone  Rehab Prognosis: Good  Pt's response to treatment:  Patient with improvement since resuming HEP and focusing on proper breathing.  A patient did report some concern with night sweats and was directed to contact physician (primary) to discuss further.      Pain end of session: 0    Plan:  OP PT Plan  Treatment/Interventions: Dry needling, Education/ Instruction, Electrical stimulation, Biofeedback, Manual therapy, Neuromuscular re-education, Therapeutic exercises, Therapeutic activities  PT Plan: Skilled PT  PT Frequency: 1 time per week  Duration: 10 sessions  Number of Treatments Authorized: 30 visits, 50$ copay  Rehab Potential: Good  Continue with current POC/no changes    Assessment of current progress against goals:  Progressing toward functional goals    Goals:  Active       Pelvic Floor       Patient will continue to report no pain post urination       Start:  01/22/24    Expected End:  03/12/24            Reduce frequency or urine loss to: 75%  post urination        Start:  01/22/24    Expected End:  05/01/24            Patient will have good transverse abdominis activation with dynamic movement to stabilize pelvic floor and spine during ADLs       Start:  01/22/24    Expected End:  05/01/24            patient will be knowledgeable with regards to diaphragmatic breathing to downtrain and reduce tightness       Start:  01/22/24    Expected End:  03/12/24            Patient will increase LE strength to at least 4+/5 to improve ability to perform ADLs without incontinence       Start:   01/22/24    Expected End:  05/01/24

## 2024-04-02 ENCOUNTER — TREATMENT (OUTPATIENT)
Dept: PHYSICAL THERAPY | Facility: CLINIC | Age: 34
End: 2024-04-02
Payer: COMMERCIAL

## 2024-04-02 DIAGNOSIS — R30.9 PAINFUL URINATION: ICD-10-CM

## 2024-04-02 PROCEDURE — 97110 THERAPEUTIC EXERCISES: CPT | Mod: GP | Performed by: PHYSICAL THERAPIST

## 2024-04-02 ASSESSMENT — PAIN - FUNCTIONAL ASSESSMENT: PAIN_FUNCTIONAL_ASSESSMENT: 0-10

## 2024-04-02 ASSESSMENT — PAIN SCALES - GENERAL: PAINLEVEL_OUTOF10: 0 - NO PAIN

## 2024-04-02 NOTE — PROGRESS NOTES
Physical Therapy Treatment    Patient Name: Lina Christopher  MRN: [id number]  Encounter date:  4/2/2024  Time Calculation  Start Time: 0830  Stop Time: 0923  Time Calculation (min): 53 min     PT Therapeutic Procedures Time Entry  Therapeutic Exercise Time Entry: 53    Visit Number:  6 (including evaluation)  Planned total visits: 10 per POC  Visit Authorized/Insurance Considerations:  $50 COPAY PER VISIT/ 100% COVERAGE/ NO AUTH/ 30 VISITS THEN REVIEW IF NEEDED/ PER [facility] SITE REF#    Current Problem  1. Painful urination  Follow Up In Physical Therapy        Precautions  Precautions  Precautions Comment: no precautions    Pain  Pain Assessment: 0-10  Pain Score: 0 - No pain  No pain complaints  Subjective  General  Reason for Referral: painful urination  Referred By: Aurea Cash, CNP     Woke up this morning with right leg cramps.  Doing exercises and noticed post void is much better.      Objective  Good TA activation    Treatment:  Updated HEP:    SKTC with opposite LE straight x 10   Happy baby 10 sec x 4 with breathing  Piriformis stretch push pull 10 sec x 4   Bridge with adduction   Hip abduction with blue band x 10   Dead bug x 10   Bird dog x 10 ( challenging)   Joy pose x 10 sec x 4  Heel raises x 10   Hip abd side steps with band (above knees) 10' x 4  Lat pull down with band x 10   Wide row with band (blue) x 10     [employer] update emailed to patient.       Current HEP:  As above    OP EDUCATION:  Outpatient Education  Individual(s) Educated: Patient  Education Provided: Anatomy, Home Exercise Program, POC, Physiology  Risk and Benefits Discussed with Patient/Caregiver/Other: yes  Patient/Caregiver Demonstrated Understanding: yes  Plan of Care Discussed and Agreed Upon: yes  Patient Response to Education: Patient/Caregiver Verbalized Understanding of Information, Patient/Caregiver Performed Return Demonstration of Exercises/Activities, Patient/Caregiver Asked Appropriate Questions    Assessment:  PT  Assessment  PT Assessment Results: Decreased strength, Decreased range of motion, Decreased coordination, Impaired tone  Rehab Prognosis: Good  Evaluation/Treatment Tolerance: Patient tolerated treatment well  Pt's response to treatment:  Patient with improved continence since resuming exercises.  HEP updated to consolidate and update program.  Patient to schedule in 4 weeks and monitor symptoms if needs to return sooner.      Pain end of session: 0    Plan:  OP PT Plan  Treatment/Interventions: Dry needling, Education/ Instruction, Electrical stimulation, Biofeedback, Manual therapy, Neuromuscular re-education, Therapeutic exercises, Therapeutic activities  PT Plan: Skilled PT  PT Frequency: 1 time per week  Duration: 10 sessions  Number of Treatments Authorized: 30 visits, 50$ copay  Rehab Potential: Good  Continue with current POC with the following changes to schedule in 4 weeks and monitor progress    Assessment of current progress against goals:  Progressing toward functional goals    Goals:  Active       Pelvic Floor       Patient will continue to report no pain post urination (Progressing)       Start:  01/22/24    Expected End:  03/12/24            Reduce frequency or urine loss to: 75%  post urination  (Met)       Start:  01/22/24    Expected End:  05/01/24    Resolved:  04/02/24         Patient will have good transverse abdominis activation with dynamic movement to stabilize pelvic floor and spine during ADLs (Met)       Start:  01/22/24    Expected End:  05/01/24    Resolved:  04/02/24         patient will be knowledgeable with regards to diaphragmatic breathing to downtrain and reduce tightness (Met)       Start:  01/22/24    Expected End:  03/12/24    Resolved:  04/02/24         Patient will increase LE strength to at least 4+/5 to improve ability to perform ADLs without incontinence (Progressing)       Start:  01/22/24    Expected End:  05/01/24

## 2024-06-03 PROBLEM — K08.89 OTHER SPECIFIED DISORDERS OF TEETH AND SUPPORTING STRUCTURES: Status: RESOLVED | Noted: 2018-04-29 | Resolved: 2024-06-03

## 2024-06-03 PROBLEM — D72.820 LYMPHOCYTOSIS: Status: ACTIVE | Noted: 2024-06-03

## 2024-06-03 PROBLEM — E55.9 VITAMIN D DEFICIENCY: Status: ACTIVE | Noted: 2023-02-23

## 2024-06-03 PROBLEM — R79.89 ELEVATED LIVER FUNCTION TESTS: Status: ACTIVE | Noted: 2024-06-03

## 2024-06-03 PROBLEM — R22.1 LOCALIZED SWELLING, MASS AND LUMP, NECK: Status: ACTIVE | Noted: 2023-03-01

## 2024-06-03 PROBLEM — F19.11 HISTORY OF DRUG ABUSE (MULTI): Status: RESOLVED | Noted: 2023-03-01 | Resolved: 2024-06-03

## 2024-06-03 PROBLEM — R07.0 THROAT PAIN: Status: RESOLVED | Noted: 2024-06-03 | Resolved: 2024-06-03

## 2024-06-03 PROBLEM — M54.2 NECK PAIN: Status: ACTIVE | Noted: 2023-03-27

## 2024-06-03 PROBLEM — R13.10 DYSPHAGIA: Status: ACTIVE | Noted: 2023-03-01

## 2024-06-03 PROBLEM — Z76.89 PERSONS ENCOUNTERING HEALTH SERVICES IN OTHER SPECIFIED CIRCUMSTANCES: Status: RESOLVED | Noted: 2024-06-03 | Resolved: 2024-06-03

## 2024-06-03 PROBLEM — B18.2 CHRONIC HEPATITIS C VIRUS INFECTION (MULTI): Status: ACTIVE | Noted: 2022-05-20

## 2024-06-03 PROBLEM — D72.821 MONOCYTOSIS: Status: ACTIVE | Noted: 2024-06-03

## 2024-06-03 PROBLEM — R73.9 HYPERGLYCEMIA, UNSPECIFIED: Status: ACTIVE | Noted: 2023-02-23

## 2024-06-03 PROBLEM — Z72.0 TOBACCO USE: Status: ACTIVE | Noted: 2018-04-29

## 2024-06-03 PROBLEM — R06.02 SHORTNESS OF BREATH: Status: ACTIVE | Noted: 2024-06-03

## 2024-06-03 PROBLEM — Z86.19 HEPATITIS C VIRUS INFECTION CURED AFTER ANTIVIRAL DRUG THERAPY: Status: RESOLVED | Noted: 2024-06-03 | Resolved: 2024-06-03

## 2024-06-03 PROBLEM — E78.00 PURE HYPERCHOLESTEROLEMIA: Status: ACTIVE | Noted: 2023-02-23

## 2024-06-03 PROBLEM — Z20.822 CONTACT WITH AND (SUSPECTED) EXPOSURE TO COVID-19: Status: RESOLVED | Noted: 2023-03-01 | Resolved: 2024-06-03

## 2024-06-03 PROBLEM — K04.7 PERIAPICAL ABSCESS WITHOUT SINUS: Status: RESOLVED | Noted: 2018-04-29 | Resolved: 2024-06-03

## 2024-06-03 PROBLEM — J34.2 DEVIATED NASAL SEPTUM: Status: ACTIVE | Noted: 2024-06-03

## 2024-06-03 PROBLEM — E03.9 HYPOTHYROIDISM: Status: ACTIVE | Noted: 2023-02-23

## 2024-06-03 RX ORDER — IBUPROFEN 800 MG/1
TABLET ORAL
COMMUNITY
Start: 2024-03-20 | End: 2024-06-04 | Stop reason: ALTCHOICE

## 2024-06-03 RX ORDER — FLUTICASONE PROPIONATE 110 UG/1
AEROSOL, METERED RESPIRATORY (INHALATION) EVERY 12 HOURS
COMMUNITY
Start: 2022-02-25 | End: 2024-06-04 | Stop reason: ALTCHOICE

## 2024-06-03 RX ORDER — PREDNISONE 20 MG/1
TABLET ORAL
COMMUNITY
Start: 2024-03-27 | End: 2024-06-04 | Stop reason: ALTCHOICE

## 2024-06-03 RX ORDER — VELPATASVIR AND SOFOSBUVIR 100; 400 MG/1; MG/1
1 TABLET, FILM COATED ORAL DAILY
COMMUNITY
Start: 2022-06-21 | End: 2024-06-04 | Stop reason: ALTCHOICE

## 2024-06-03 RX ORDER — CALCIUM CARB/VITAMIN D3/VIT K1 500-500-40
TABLET,CHEWABLE ORAL
COMMUNITY
Start: 2022-02-25

## 2024-06-04 ENCOUNTER — OFFICE VISIT (OUTPATIENT)
Dept: PRIMARY CARE | Facility: CLINIC | Age: 34
End: 2024-06-04
Payer: COMMERCIAL

## 2024-06-04 VITALS
HEART RATE: 63 BPM | HEIGHT: 71 IN | TEMPERATURE: 97.6 F | BODY MASS INDEX: 25.2 KG/M2 | DIASTOLIC BLOOD PRESSURE: 72 MMHG | SYSTOLIC BLOOD PRESSURE: 117 MMHG | WEIGHT: 180 LBS | OXYGEN SATURATION: 97 %

## 2024-06-04 DIAGNOSIS — E55.9 VITAMIN D DEFICIENCY: ICD-10-CM

## 2024-06-04 DIAGNOSIS — H61.22 IMPACTED CERUMEN OF LEFT EAR: ICD-10-CM

## 2024-06-04 DIAGNOSIS — Z01.89 ENCOUNTER FOR ROUTINE LABORATORY TESTING: Primary | ICD-10-CM

## 2024-06-04 PROCEDURE — 69209 REMOVE IMPACTED EAR WAX UNI: CPT | Performed by: INTERNAL MEDICINE

## 2024-06-04 PROCEDURE — 99212 OFFICE O/P EST SF 10 MIN: CPT | Performed by: INTERNAL MEDICINE

## 2024-06-04 ASSESSMENT — PATIENT HEALTH QUESTIONNAIRE - PHQ9
2. FEELING DOWN, DEPRESSED OR HOPELESS: NOT AT ALL
1. LITTLE INTEREST OR PLEASURE IN DOING THINGS: NOT AT ALL
SUM OF ALL RESPONSES TO PHQ9 QUESTIONS 1 AND 2: 0

## 2024-06-04 ASSESSMENT — ENCOUNTER SYMPTOMS
DEPRESSION: 0
LOSS OF SENSATION IN FEET: 0
OCCASIONAL FEELINGS OF UNSTEADINESS: 0

## 2024-06-04 ASSESSMENT — PAIN SCALES - GENERAL: PAINLEVEL: 0-NO PAIN

## 2024-06-04 NOTE — PROGRESS NOTES
Patient ID: Lina Christopher is a 33 y.o. male.    Ear Cerumen Removal    Date/Time: 6/4/2024 1:17 PM    Performed by: Pennie Carrero MA  Authorized by: Jesus Garces MD    Consent:     Consent obtained:  Verbal    Consent given by:  Patient    Risks, benefits, and alternatives were discussed: yes      Risks discussed:  Bleeding, dizziness, infection, incomplete removal, TM perforation and pain    Alternatives discussed:  No treatment  Universal protocol:     Procedure explained and questions answered to patient or proxy's satisfaction: yes      Relevant documents present and verified: no      Test results available: no      Imaging studies available: no      Required blood products, implants, devices, and special equipment available: no      Site/side marked: yes      Immediately prior to procedure, a time out was called: yes      Patient identity confirmed:  Verbally with patient  Procedure details:     Location:  L ear    Procedure type: irrigation      Procedure outcomes: cerumen removed    Post-procedure details:     Inspection:  TM intact and ear canal clear    Hearing quality:  Improved    Procedure completion:  Tolerated well, no immediate complications

## 2024-06-07 ENCOUNTER — DOCUMENTATION (OUTPATIENT)
Dept: PHYSICAL THERAPY | Facility: CLINIC | Age: 34
End: 2024-06-07
Payer: COMMERCIAL

## 2024-06-08 NOTE — PROGRESS NOTES
Physical Therapy    Discharge Summary    Name: Lina Christopher  MRN: [id number]  : 1990  Date: 24    Discharge Summary: PT    Discharge Information: Date of discharge 24, Date of last visit 24, Date of evaluation 24, Number of attended visits 6     Therapy Summary: as of last session, patient has been feeling better, minimal to nil post void dribble, Improved continence and pain since performing exercises.      Discharge Status: Achieved all goals.       Rehab Discharge Reason: Achieved all and/or the most significant goals(s)

## 2024-06-11 NOTE — PROGRESS NOTES
- Lina Fair 33 y.o. male is here today for ear lavage - ( ears blocked and he used the debrox for the last few days         - Patient denies any other symptoms or concerns at this time.       - patient denies any adverse reactions to or concerns with his/her meds.       - Problem list and medication reconciliation done today.  - V.S. Stable. No changes at this time.   On exam he has bilateral cerumen plug        Plan - Bilateral ear Lavage         See procedure note       Exam after the ears lavage -- Normal EEC and tympanic membrane bilaterally. Patient tolerated the procedure well w/o any residual dizziness             Mikel BURK

## 2024-06-26 ENCOUNTER — APPOINTMENT (OUTPATIENT)
Dept: DERMATOLOGY | Facility: CLINIC | Age: 34
End: 2024-06-26
Payer: COMMERCIAL

## 2024-12-09 ENCOUNTER — APPOINTMENT (OUTPATIENT)
Dept: PRIMARY CARE | Facility: CLINIC | Age: 34
End: 2024-12-09
Payer: COMMERCIAL

## 2024-12-10 ENCOUNTER — LAB (OUTPATIENT)
Dept: LAB | Facility: LAB | Age: 34
End: 2024-12-10
Payer: COMMERCIAL

## 2024-12-10 ENCOUNTER — OFFICE VISIT (OUTPATIENT)
Dept: PRIMARY CARE | Facility: CLINIC | Age: 34
End: 2024-12-10
Payer: COMMERCIAL

## 2024-12-10 VITALS
SYSTOLIC BLOOD PRESSURE: 109 MMHG | DIASTOLIC BLOOD PRESSURE: 71 MMHG | WEIGHT: 172 LBS | OXYGEN SATURATION: 100 % | TEMPERATURE: 97.5 F | BODY MASS INDEX: 24.08 KG/M2 | HEIGHT: 71 IN | HEART RATE: 62 BPM

## 2024-12-10 DIAGNOSIS — Z01.89 ENCOUNTER FOR ROUTINE LABORATORY TESTING: ICD-10-CM

## 2024-12-10 DIAGNOSIS — E55.9 VITAMIN D DEFICIENCY: ICD-10-CM

## 2024-12-10 DIAGNOSIS — Z23 ENCOUNTER FOR IMMUNIZATION: ICD-10-CM

## 2024-12-10 DIAGNOSIS — E78.2 MIXED HYPERLIPIDEMIA: ICD-10-CM

## 2024-12-10 DIAGNOSIS — Z00.00 ENCOUNTER FOR WELLNESS EXAMINATION: Primary | ICD-10-CM

## 2024-12-10 LAB
25(OH)D3 SERPL-MCNC: 22 NG/ML (ref 30–100)
ALBUMIN SERPL BCP-MCNC: 4.4 G/DL (ref 3.4–5)
ALP SERPL-CCNC: 32 U/L (ref 33–120)
ALT SERPL W P-5'-P-CCNC: 12 U/L (ref 10–52)
ANION GAP SERPL CALCULATED.3IONS-SCNC: 8 MMOL/L (ref 10–20)
AST SERPL W P-5'-P-CCNC: 15 U/L (ref 9–39)
BASOPHILS # BLD AUTO: 0.04 X10*3/UL (ref 0–0.1)
BASOPHILS NFR BLD AUTO: 0.8 %
BILIRUB SERPL-MCNC: 0.6 MG/DL (ref 0–1.2)
BUN SERPL-MCNC: 9 MG/DL (ref 6–23)
CALCIUM SERPL-MCNC: 9.3 MG/DL (ref 8.6–10.3)
CHLORIDE SERPL-SCNC: 105 MMOL/L (ref 98–107)
CHOLEST SERPL-MCNC: 192 MG/DL (ref 0–199)
CHOLEST/HDLC SERPL: 4.8 {RATIO}
CO2 SERPL-SCNC: 30 MMOL/L (ref 21–32)
CREAT SERPL-MCNC: 0.96 MG/DL (ref 0.5–1.3)
EGFRCR SERPLBLD CKD-EPI 2021: >90 ML/MIN/1.73M*2
EOSINOPHIL # BLD AUTO: 0.16 X10*3/UL (ref 0–0.7)
EOSINOPHIL NFR BLD AUTO: 3.1 %
ERYTHROCYTE [DISTWIDTH] IN BLOOD BY AUTOMATED COUNT: 12.4 % (ref 11.5–14.5)
EST. AVERAGE GLUCOSE BLD GHB EST-MCNC: 94 MG/DL
GLUCOSE SERPL-MCNC: 90 MG/DL (ref 74–99)
HBA1C MFR BLD: 4.9 %
HCT VFR BLD AUTO: 42.7 % (ref 41–52)
HDLC SERPL-MCNC: 40.1 MG/DL
HGB BLD-MCNC: 14.5 G/DL (ref 13.5–17.5)
IMM GRANULOCYTES # BLD AUTO: 0 X10*3/UL (ref 0–0.7)
IMM GRANULOCYTES NFR BLD AUTO: 0 % (ref 0–0.9)
LDLC SERPL CALC-MCNC: 140 MG/DL
LYMPHOCYTES # BLD AUTO: 1.89 X10*3/UL (ref 1.2–4.8)
LYMPHOCYTES NFR BLD AUTO: 36.8 %
MCH RBC QN AUTO: 30.3 PG (ref 26–34)
MCHC RBC AUTO-ENTMCNC: 34 G/DL (ref 32–36)
MCV RBC AUTO: 89 FL (ref 80–100)
MONOCYTES # BLD AUTO: 0.45 X10*3/UL (ref 0.1–1)
MONOCYTES NFR BLD AUTO: 8.8 %
NEUTROPHILS # BLD AUTO: 2.6 X10*3/UL (ref 1.2–7.7)
NEUTROPHILS NFR BLD AUTO: 50.5 %
NON HDL CHOLESTEROL: 152 MG/DL (ref 0–149)
NRBC BLD-RTO: 0 /100 WBCS (ref 0–0)
PLATELET # BLD AUTO: 277 X10*3/UL (ref 150–450)
POTASSIUM SERPL-SCNC: 4.2 MMOL/L (ref 3.5–5.3)
PROT SERPL-MCNC: 6.5 G/DL (ref 6.4–8.2)
RBC # BLD AUTO: 4.79 X10*6/UL (ref 4.5–5.9)
SODIUM SERPL-SCNC: 139 MMOL/L (ref 136–145)
TRIGL SERPL-MCNC: 59 MG/DL (ref 0–149)
TSH SERPL-ACNC: 3.04 MIU/L (ref 0.44–3.98)
VLDL: 12 MG/DL (ref 0–40)
WBC # BLD AUTO: 5.1 X10*3/UL (ref 4.4–11.3)

## 2024-12-10 PROCEDURE — 80061 LIPID PANEL: CPT

## 2024-12-10 PROCEDURE — 84443 ASSAY THYROID STIM HORMONE: CPT

## 2024-12-10 PROCEDURE — 83036 HEMOGLOBIN GLYCOSYLATED A1C: CPT

## 2024-12-10 PROCEDURE — 80053 COMPREHEN METABOLIC PANEL: CPT

## 2024-12-10 PROCEDURE — 36415 COLL VENOUS BLD VENIPUNCTURE: CPT

## 2024-12-10 PROCEDURE — 85025 COMPLETE CBC W/AUTO DIFF WBC: CPT

## 2024-12-10 PROCEDURE — 82306 VITAMIN D 25 HYDROXY: CPT

## 2024-12-10 ASSESSMENT — PAIN SCALES - GENERAL: PAINLEVEL_OUTOF10: 0-NO PAIN

## 2024-12-10 NOTE — PROGRESS NOTES
[facility]: [facility]  PHYSICAL EXAM      Lina Christopher is a 34 y.o. male that is presenting today for Annual Exam.    Assessment/Plan   Diagnoses and all orders for this visit:  Encounter for wellness examination       Stable overall, Did not go for his BW - he'll go for it this am / Updated HM - Vacc   Encounter for immunization  -     Hepatitis A vaccine, age 19 years and greater (HAVRIX)  -     Hepatitis B vaccine, 18yrs and older (HEPLISAV)  Other orders  -     Follow Up In Primary Care  -     Follow Up In Primary Care; Future  Subjective   HPI    - Lina Christopher 34 y.o. male is here today for his CPE/ did not go for his BW - will go this am.       - Patient denies any symptoms or concerns at this time.       - patient denies any adverse reactions to or concerns with his/her meds.       - Problem list and medication reconciliation done today.  - V.S. Stable. No changes at this time.  - Encouraged continued diet and exercise modification.     Review of Systems   All pertinent POSITIVES as noted per HPI.  All other systems have been reviewed and are NEGATIVE and /or Noncontributory to this patient current visit or complaint.     Objective   Vitals:    12/10/24 0801   BP: 109/71   Pulse: 62   Temp: 36.4 °C (97.5 °F)   SpO2: 100%     Body mass index is 23.99 kg/m².  Physical Exam  Vitals and nursing note reviewed.   Constitutional:       Appearance: Normal appearance.   HENT:      Head: Normocephalic and atraumatic.      Right Ear: Tympanic membrane, ear canal and external ear normal.      Left Ear: Tympanic membrane, ear canal and external ear normal.      Nose: Nose normal.      Mouth/Throat:      Mouth: Mucous membranes are moist.      Pharynx: Oropharynx is clear.   Eyes:      Extraocular Movements: Extraocular movements intact.      Conjunctiva/sclera: Conjunctivae normal.      Pupils: Pupils are equal, round, and reactive to light.   Neck:      Vascular: No carotid bruit.  "  Cardiovascular:      Rate and Rhythm: Normal rate and regular rhythm.      Pulses: Normal pulses.      Heart sounds: Normal heart sounds.   Pulmonary:      Effort: Pulmonary effort is normal.      Breath sounds: Normal breath sounds.   Abdominal:      General: Abdomen is flat. Bowel sounds are normal.      Palpations: Abdomen is soft.   Musculoskeletal:         General: No swelling. Normal range of motion.      Cervical back: Normal range of motion and neck supple.   Lymphadenopathy:      Cervical: No cervical adenopathy.   Skin:     General: Skin is warm and dry.   Neurological:      General: No focal deficit present.      Mental Status: He is alert and oriented to person, place, and time. Mental status is at baseline.   Psychiatric:         Mood and Affect: Mood normal.         Behavior: Behavior normal.       Diagnostic Results   Lab Results   Component Value Date    GLUCOSE 95 12/28/2023    CALCIUM 9.4 12/28/2023     12/28/2023    K 3.8 12/28/2023    CO2 26 12/28/2023     12/28/2023    BUN 12 12/28/2023    CREATININE 1.00 12/28/2023     Lab Results   Component Value Date    ALT 11 12/28/2023    AST 19 12/28/2023    ALKPHOS 38 12/28/2023    BILITOT 0.4 12/28/2023     Lab Results   Component Value Date    WBC 9.8 12/28/2023    HGB 14.9 12/28/2023    HCT 43.9 12/28/2023    MCV 89 12/28/2023     12/28/2023     Lab Results   Component Value Date    CHOL 212 (H) 02/23/2023    CHOL 167 02/25/2022     Lab Results   Component Value Date    HDL 37 (L) 02/23/2023    HDL 36.1 (A) 02/25/2022     Lab Results   Component Value Date    LDLCALC 157 (H) 02/23/2023     Lab Results   Component Value Date    TRIG 88 02/23/2023    TRIG 100 02/25/2022     No components found for: \"CHOLHDL\"  Lab Results   Component Value Date    HGBA1C 5.2 02/23/2023     Other labs not included in the list above were reviewed either before or during this encounter.    History   Past Medical History:   Diagnosis Date    Contact with " and (suspected) exposure to covid-19 03/01/2023    Hepatitis C virus infection cured after antiviral drug therapy 06/03/2024    History of drug abuse (Multi) 03/01/2023    Other specified disorders of teeth and supporting structures 04/29/2018    Periapical abscess without sinus 04/29/2018    Persons encountering health services in other specified circumstances 06/03/2024     Past Surgical History:   Procedure Laterality Date    US GUIDED NEEDLE LIVER BIOPSY  6/9/2022    US GUIDED NEEDLE LIVER BIOPSY 6/9/2022 [facility]     No family history on file.  Social History     Socioeconomic History    Marital status: Single     Spouse name: Not on file    Number of children: Not on file    Years of education: Not on file    Highest education level: Not on file   Occupational History    Not on file   Tobacco Use    Smoking status: Former     Types: Cigarettes     Passive exposure: Past    Smokeless tobacco: Never   Vaping Use    Vaping status: Never Used   Substance and Sexual Activity    Alcohol use: Yes     Comment: socailly    Drug use: Never    Sexual activity: Not on file   Other Topics Concern    Not on file   Social History Narrative    Not on file     Social Drivers of Health     Financial Resource Strain: Not on file   Food Insecurity: Not on file   Transportation Needs: Not on file   Physical Activity: Not on file   Stress: Not on file   Social Connections: Not on file   Intimate Partner Violence: Not on file   Housing Stability: Not on file     No Known Allergies  Current Outpatient Medications on File Prior to Visit   Medication Sig Dispense Refill    fish oil concentrate (Omega-3) 120-180 mg capsule Take by mouth. (Patient not taking: Reported on 12/10/2024)      multivit-min-ferrous fumarate (Multi Vitamin) 9 mg iron/15 mL liquid Take by mouth. (Patient not taking: Reported on 12/10/2024)       No current facility-administered medications on file prior to visit.     Immunization History   Administered  Date(s) Administered    Tdap vaccine, age 7 year and older (BOOSTRIX, ADACEL) 07/11/2016     Patient's medical history was reviewed and updated either before or during this encounter.       Jesus Garces MD

## 2025-01-14 ENCOUNTER — APPOINTMENT (OUTPATIENT)
Dept: PRIMARY CARE | Facility: CLINIC | Age: 35
End: 2025-01-14
Payer: COMMERCIAL

## 2025-01-16 ENCOUNTER — CLINICAL SUPPORT (OUTPATIENT)
Dept: PRIMARY CARE | Facility: CLINIC | Age: 35
End: 2025-01-16
Payer: COMMERCIAL

## 2025-01-16 DIAGNOSIS — Z23 ENCOUNTER FOR IMMUNIZATION: ICD-10-CM

## 2025-01-16 PROCEDURE — 90471 IMMUNIZATION ADMIN: CPT | Performed by: INTERNAL MEDICINE

## 2025-01-16 PROCEDURE — 90739 HEPB VACC 2/4 DOSE ADULT IM: CPT | Performed by: INTERNAL MEDICINE

## 2025-06-10 ENCOUNTER — APPOINTMENT (OUTPATIENT)
Dept: PRIMARY CARE | Facility: CLINIC | Age: 35
End: 2025-06-10
Payer: COMMERCIAL

## 2025-06-12 LAB
CHOLEST SERPL-MCNC: 224 MG/DL
CHOLEST/HDLC SERPL: 5 (CALC)
HDLC SERPL-MCNC: 45 MG/DL
LDLC SERPL CALC-MCNC: 155 MG/DL (CALC)
NONHDLC SERPL-MCNC: 179 MG/DL (CALC)
TRIGL SERPL-MCNC: 121 MG/DL

## 2025-06-13 ENCOUNTER — CLINICAL SUPPORT (OUTPATIENT)
Dept: PRIMARY CARE | Facility: CLINIC | Age: 35
End: 2025-06-13
Payer: COMMERCIAL

## 2025-06-13 PROCEDURE — 90739 HEPB VACC 2/4 DOSE ADULT IM: CPT | Performed by: INTERNAL MEDICINE

## 2025-06-13 PROCEDURE — 90471 IMMUNIZATION ADMIN: CPT | Performed by: INTERNAL MEDICINE

## 2025-06-26 ENCOUNTER — APPOINTMENT (OUTPATIENT)
Dept: PRIMARY CARE | Facility: CLINIC | Age: 35
End: 2025-06-26
Payer: COMMERCIAL

## 2025-08-08 ENCOUNTER — OFFICE VISIT (OUTPATIENT)
Dept: PRIMARY CARE | Facility: CLINIC | Age: 35
End: 2025-08-08
Payer: COMMERCIAL

## 2025-08-08 VITALS
TEMPERATURE: 96.6 F | HEART RATE: 70 BPM | HEIGHT: 71 IN | WEIGHT: 169 LBS | BODY MASS INDEX: 23.66 KG/M2 | DIASTOLIC BLOOD PRESSURE: 84 MMHG | SYSTOLIC BLOOD PRESSURE: 140 MMHG | OXYGEN SATURATION: 98 %

## 2025-08-08 DIAGNOSIS — F17.210 CIGARETTE NICOTINE DEPENDENCE WITHOUT COMPLICATION: ICD-10-CM

## 2025-08-08 DIAGNOSIS — R73.9 HYPERGLYCEMIA: ICD-10-CM

## 2025-08-08 DIAGNOSIS — Z86.2 HISTORY OF ANEMIA: ICD-10-CM

## 2025-08-08 DIAGNOSIS — E78.00 PURE HYPERCHOLESTEROLEMIA: Primary | ICD-10-CM

## 2025-08-08 DIAGNOSIS — E55.9 VITAMIN D DEFICIENCY: ICD-10-CM

## 2025-08-08 ASSESSMENT — PAIN SCALES - GENERAL: PAINLEVEL_OUTOF10: 0-NO PAIN

## 2025-08-08 NOTE — PROGRESS NOTES
[facility]: [facility]  PROGRESS NOTE      Lina Christopher is a 35 y.o. male that is presenting today for Follow-up.    Assessment/Plan   Diagnoses and all orders for this visit:      Discussed BW and /or DI results with the patient and / or family and answered all questions.   Pure hypercholesterolemia      Moderate elevation elevation of LDL and non HDL C. With low normal HDL ( smoker ) and Hi Risk Ratio    Patient has not been following low fat diet to a certain degree and not exercising (busy at work). LDL and non HDL chol. are worse comparing to last year results -      Long discussion of the [facility] Tx Answered all his questions    Patient opted to have 3 months to improve his diet and if by then his results are not better -- He'll go on the [facility].  -     Lipid panel; Future  -     TSH with reflex to Free T4 if abnormal; Future  -     Comprehensive Metabolic Panel; Future  Cigarette nicotine dependence without complication      Discussed smoking Cessation / Not ready to do so  Hyperglycemia  -     Hemoglobin A1C; Future  -     TSH with reflex to Free T4 if abnormal; Future  -     Comprehensive Metabolic Panel; Future  Vitamin D deficiency  -     Vitamin D 25-Hydroxy,Total (for eval of Vitamin D levels); Future  History of anemia  -     CBC and Auto Differential; Future  Other orders  -     Follow Up In Primary Care; Future    Subjective   HPI    - Lina Christopher 35 y.o. male is here today for his 6 months FUV to discuss his lipid panel.        - Patient denies any symptoms or concerns at this time.       - patient denies any adverse reactions to or concerns with his/her meds.       - Problem list and medication reconciliation done today.  - V.S. Stable. No changes at this time.  - Encouraged continued diet and exercise modification.     Review of Systems   All pertinent POSITIVES as noted per HPI.  All other systems have been reviewed and are NEGATIVE and /or Noncontributory to this  patient current visit or complaint.     Objective   Vitals:    08/08/25 1128   BP: 140/84   Pulse: 70   Temp: 35.9 °C (96.6 °F)   SpO2: 98%      Body mass index is 23.57 kg/m².  Physical Exam  Vitals and nursing note reviewed.   Constitutional:       Appearance: Normal appearance.   HENT:      Head: Normocephalic and atraumatic.   Neck:      Vascular: No carotid bruit.     Cardiovascular:      Rate and Rhythm: Normal rate and regular rhythm.      Pulses: Normal pulses.      Heart sounds: Normal heart sounds.   Pulmonary:      Effort: Pulmonary effort is normal.      Breath sounds: Normal breath sounds.   Abdominal:      General: Abdomen is flat. Bowel sounds are normal.      Palpations: Abdomen is soft.     Musculoskeletal:         General: No swelling. Normal range of motion.      Cervical back: Neck supple.   Lymphadenopathy:      Cervical: No cervical adenopathy.     Skin:     General: Skin is warm and dry.     Neurological:      Mental Status: He is alert.     Psychiatric:         Mood and Affect: Mood normal.       Diagnostic Results   Lab Results   Component Value Date    GLUCOSE 90 12/10/2024    CALCIUM 9.3 12/10/2024     12/10/2024    K 4.2 12/10/2024    CO2 30 12/10/2024     12/10/2024    BUN 9 12/10/2024    CREATININE 0.96 12/10/2024     Lab Results   Component Value Date    ALT 12 12/10/2024    AST 15 12/10/2024    ALKPHOS 32 (L) 12/10/2024    BILITOT 0.6 12/10/2024     Lab Results   Component Value Date    WBC 5.1 12/10/2024    HGB 14.5 12/10/2024    HCT 42.7 12/10/2024    MCV 89 12/10/2024     12/10/2024     Lab Results   Component Value Date    CHOL 224 (H) 06/11/2025    CHOL 192 12/10/2024    CHOL 212 (H) 02/23/2023     Lab Results   Component Value Date    HDL 45 06/11/2025    HDL 40.1 12/10/2024    HDL 37 (L) 02/23/2023     Lab Results   Component Value Date    LDLCALC 155 (H) 06/11/2025    LDLCALC 140 (H) 12/10/2024    LDLCALC 157 (H) 02/23/2023     Lab Results   Component Value  "Date    TRIG 121 06/11/2025    TRIG 59 12/10/2024    TRIG 88 02/23/2023     No components found for: \"CHOLHDL\"  Lab Results   Component Value Date    HGBA1C 4.9 12/10/2024     Other labs not included in the list above were reviewed either before or during this encounter.    History    Medical History[1]  Surgical History[2]  Family History[3]  Social History     Socioeconomic History    Marital status: Single     Spouse name: Not on file    Number of children: Not on file    Years of education: Not on file    Highest education level: Not on file   Occupational History    Not on file   Tobacco Use    Smoking status: Some Days     Current packs/day: 0.25     Types: Cigarettes     Passive exposure: Past    Smokeless tobacco: Never   Vaping Use    Vaping status: Every Day    Substances: Nicotine, Flavoring    Devices: Disposable   Substance and Sexual Activity    Alcohol use: Yes     Comment: socailly    Drug use: Never    Sexual activity: Not on file   Other Topics Concern    Not on file   Social History Narrative    Not on file     Social Drivers of Health     Financial Resource Strain: Not on file   Food Insecurity: Not on file   Transportation Needs: Not on file   Physical Activity: Not on file   Stress: Not on file   Social Connections: Not on file   Intimate Partner Violence: Not on file   Housing Stability: Not on file     Allergies[4]  Medications Ordered Prior to Encounter[5]  Immunization History   Administered Date(s) Administered    Hepatitis A vaccine, age 19 years and greater (HAVRIX) 12/10/2024    Hepatitis B vaccine, 18yrs and older(HEPLISAV) 12/10/2024, 01/16/2025, 06/13/2025    Hepatitis B vaccine, 19 yrs and under (RECOMBIVAX, ENGERIX) 07/17/2007    MMR vaccine, subcutaneous (MMR II) 09/10/2001    Tdap vaccine, age 7 year and older (BOOSTRIX, ADACEL) 07/11/2016     Patient's medical history was reviewed and updated either before or during this encounter.       Jesus Garces MD       [1]   Past " Medical History:  Diagnosis Date    Contact with and (suspected) exposure to covid-19 03/01/2023    Hepatitis C virus infection cured after antiviral drug therapy 06/03/2024    History of drug abuse 03/01/2023    Other specified disorders of teeth and supporting structures 04/29/2018    Periapical abscess without sinus 04/29/2018    Persons encountering health services in other specified circumstances 06/03/2024   [2]   Past Surgical History:  Procedure Laterality Date    US GUIDED NEEDLE LIVER BIOPSY  6/9/2022    US GUIDED NEEDLE LIVER BIOPSY 6/9/2022 [facility]   [3] No family history on file.  [4] No Known Allergies  [5]   No current outpatient medications on file prior to visit.     No current facility-administered medications on file prior to visit.

## 2025-08-15 ENCOUNTER — TELEPHONE (OUTPATIENT)
Dept: PRIMARY CARE | Facility: CLINIC | Age: 35
End: 2025-08-15
Payer: COMMERCIAL

## 2025-08-17 DIAGNOSIS — E78.00 PURE HYPERCHOLESTEROLEMIA: Primary | ICD-10-CM

## 2025-08-17 RX ORDER — ROSUVASTATIN CALCIUM 5 MG/1
5 TABLET, COATED ORAL DAILY
Qty: 100 TABLET | Refills: 0 | Status: SHIPPED | OUTPATIENT
Start: 2025-08-17

## 2025-09-19 ENCOUNTER — APPOINTMENT (OUTPATIENT)
Dept: OTOLARYNGOLOGY | Facility: CLINIC | Age: 35
End: 2025-09-19
Payer: COMMERCIAL

## 2025-10-10 ENCOUNTER — APPOINTMENT (OUTPATIENT)
Dept: OTOLARYNGOLOGY | Facility: CLINIC | Age: 35
End: 2025-10-10
Payer: COMMERCIAL

## 2025-12-11 ENCOUNTER — APPOINTMENT (OUTPATIENT)
Dept: PRIMARY CARE | Facility: CLINIC | Age: 35
End: 2025-12-11
Payer: COMMERCIAL